# Patient Record
Sex: FEMALE | Employment: UNEMPLOYED | URBAN - METROPOLITAN AREA
[De-identification: names, ages, dates, MRNs, and addresses within clinical notes are randomized per-mention and may not be internally consistent; named-entity substitution may affect disease eponyms.]

---

## 2021-01-28 ENCOUNTER — HOSPITAL ENCOUNTER (EMERGENCY)
Facility: CLINIC | Age: 56
Discharge: HOME | End: 2021-01-28
Attending: EMERGENCY MEDICINE | Admitting: EMERGENCY MEDICINE
Payer: COMMERCIAL

## 2021-01-28 DIAGNOSIS — N17.9 ACUTE KIDNEY INJURY (H): ICD-10-CM

## 2021-01-28 DIAGNOSIS — R19.7 DIARRHEA, UNSPECIFIED TYPE: ICD-10-CM

## 2021-01-28 LAB
ANION GAP SERPL CALCULATED.3IONS-SCNC: 7 MMOL/L (ref 3–14)
BUN SERPL-MCNC: 36 MG/DL (ref 7–30)
CALCIUM SERPL-MCNC: 8.8 MG/DL (ref 8.5–10.1)
CHLORIDE SERPL-SCNC: 102 MMOL/L (ref 94–109)
CO2 SERPL-SCNC: 25 MMOL/L (ref 20–32)
CREAT SERPL-MCNC: 1.7 MG/DL (ref 0.52–1.04)
GFR SERPL CREATININE-BSD FRML MDRD: 33 ML/MIN/{1.73_M2}
GLUCOSE SERPL-MCNC: 110 MG/DL (ref 70–99)
LACTATE BLD-SCNC: 1.2 MMOL/L (ref 0.7–2)
POTASSIUM SERPL-SCNC: 3.2 MMOL/L (ref 3.4–5.3)
SODIUM SERPL-SCNC: 134 MMOL/L (ref 133–144)

## 2021-01-28 PROCEDURE — 258N000003 HC RX IP 258 OP 636: Performed by: EMERGENCY MEDICINE

## 2021-01-28 PROCEDURE — 83605 ASSAY OF LACTIC ACID: CPT | Performed by: EMERGENCY MEDICINE

## 2021-01-28 PROCEDURE — 99283 EMERGENCY DEPT VISIT LOW MDM: CPT | Mod: 25

## 2021-01-28 PROCEDURE — 80048 BASIC METABOLIC PNL TOTAL CA: CPT | Performed by: EMERGENCY MEDICINE

## 2021-01-28 PROCEDURE — 96361 HYDRATE IV INFUSION ADD-ON: CPT

## 2021-01-28 PROCEDURE — 96360 HYDRATION IV INFUSION INIT: CPT

## 2021-01-28 RX ADMIN — SODIUM CHLORIDE 1000 ML: 9 INJECTION, SOLUTION INTRAVENOUS at 21:13

## 2021-01-28 ASSESSMENT — ENCOUNTER SYMPTOMS
CHILLS: 1
ABDOMINAL PAIN: 0
VOMITING: 0
DIARRHEA: 1
SHORTNESS OF BREATH: 0
MYALGIAS: 0
DYSURIA: 0
BLOOD IN STOOL: 0

## 2021-01-28 ASSESSMENT — MIFFLIN-ST. JEOR: SCORE: 1049.02

## 2021-01-29 VITALS
TEMPERATURE: 98.6 F | WEIGHT: 115 LBS | HEIGHT: 61 IN | BODY MASS INDEX: 21.71 KG/M2 | OXYGEN SATURATION: 95 % | DIASTOLIC BLOOD PRESSURE: 68 MMHG | HEART RATE: 93 BPM | RESPIRATION RATE: 18 BRPM | SYSTOLIC BLOOD PRESSURE: 99 MMHG

## 2021-01-29 NOTE — ED TRIAGE NOTES
Here for generalized weakness x1 week associated with chills and coughing. Went to urgent care today, had blood and UA done that shows Creat 1.9 and WBC 12.5, got one liter of NS, was advised to come to ED for further eval. ABCs intact.

## 2021-01-29 NOTE — ED PROVIDER NOTES
"  History   Chief Complaint:  Generalized Weakness       HPI   Hallie Vila is a 55 year old female who presents with diarrhea. The patient has felt unwell for the past week, starting with feeling warm, chills. She also has had diarrhea. She has not been on any antibiotics recently. She was seen at urgent care today, but had abnormal labs, findings below, and was sent here. She denies body aches, difficulty breathing, vomiting, blood in stool, abdominal pain, dysuria.    21 Urgent Care Laboratory Findings  CBC: WBC 12.5 (H), HGB 12.1,     CMP: Glucose 144 (H), Sodium: 133 (L), Potassium: 3.4 (L), Chloride: 95 (L), Anion gap: 17 (H), BUN: 38 (H), Creatinine: 1.90 (H), GFR: 29 (L), Albumin: 3.2 (L), o/w WNL   UA: Clear yellow urine, Specific Gravity: >1.030 (H), Protein: 100, Bilirubin: Moderate, blood: Moderate, Leukocyte esterase: Small, RBC: 8-10, WBC: 21-50, Bacteria: Moderate, Squamous epithelia: Moderate, Crystals: present, otherwise WNL     Review of Systems   Constitutional: Positive for chills.   Respiratory: Negative for shortness of breath.    Gastrointestinal: Positive for diarrhea. Negative for abdominal pain, blood in stool and vomiting.   Genitourinary: Negative for dysuria.   Musculoskeletal: Negative for myalgias.   All other systems reviewed and are negative.      Allergies:  Penicillins    Medications:  The patient does not take any daily medications    Past Medical History:    Osteoporosis     Past Surgical History:    Ligate fallopian tube    section    Family History:    Mother: Hypertension     Social History:  The patient presents alone. Tobacco use: negative. Alcohol use: negative.    Physical Exam     Patient Vitals for the past 24 hrs:   BP Temp Temp src Pulse Resp SpO2 Height Weight   21 106/61 98.5  F (36.9  C) Temporal 106 18 100 % 1.549 m (5' 1\") 52.2 kg (115 lb)       Physical Exam  Constitutional: Alert, attentive  HENT:    Nose: Nose normal. "    Mouth/Throat: Oropharynx is clear, mucous membranes are moist  Eyes:  EOM are normal.    CV:  Tachycardic, regular rhythm; no murmurs, rubs or gallups  Chest: Effort normal and breath sounds normal.   GI:   Epigastrium - no tenderness, no guarding   RUQ - no tenderness or Beyer's sign   RLQ - No tenderness, no guarding, no Rovsing's sign   Suprapubic area - no tenderness, no guarding    LLQ - no tenderness, no guarding   LUQ - no tenderness, no guarding   No distension. Normal bowel sounds  MSK: Normal range of motion.   Neurological: Alert, attentive  Skin: Skin is warm and dry.      Emergency Department Course     Laboratory:  BMP: Glucose 110 (H), Potassium: 3.2 (L), Urea nitrogen: 36 (H), Creatinine: 1.70 (H), GFR: 33 (L) o/w WNL      2125 Lactic acid whole blood: 1.2     Emergency Department Course:    Reviewed:  I reviewed nursing notes, vitals and past medical history    Assessments:  2059 I obtained history and examined the patient as noted above.       Interventions:  2113 0.9% Sodium Chloride BOLUS 1000 mLs IV         Disposition:  pending      Impression & Plan     Medical Decision Making:  This is a pleasant 55-year-old female who presents for evaluation of diarrhea and abnormal labs from urgent care.  Overall symptoms appear consistent with infectious diarrhea.  She has a benign abdominal exam.  Initial heart rate is elevated blood pressure slightly lower, but she is nontoxic appearing.  Urgent care labs show prerenal acute kidney injury.  We have advised admission based on the above and recheck labs after the 1 L given in urgent care, showing continued CHANCE.  Patient is adamant that she be discharged, she notes she has been reducing her p.o. intake prior to evaluation to reduce diarrhea episodes, and is confident she can rehydrate at home.  Her additional liter is not completed here.  We will plan for my partner to recheck her to ensure improving vital signs and to revisit the admission conversation  if not.      Covid-19  Hallie Vila was evaluated during a global COVID-19 pandemic, which necessitated consideration that the patient might be at risk for infection with the SARS-CoV-2 virus that causes COVID-19.   Applicable protocols for evaluation were followed during the patient's care.       Diagnosis:    ICD-10-CM    1. Acute kidney injury (H)  N17.9 Basic metabolic panel (BMP)     Lactic acid whole blood   2. Diarrhea, unspecified type  R19.7        Scribe Disclosure:  I, Ngozi Jimenez, am serving as a scribe at 8:52 PM on 1/28/2021 to document services personally performed by Natanael Guzman MD based on my observations and the provider's statements to me.        Natanael Guzman MD  01/28/21 [id number]

## 2021-02-01 ENCOUNTER — HOSPITAL ENCOUNTER (EMERGENCY)
Facility: CLINIC | Age: 56
Discharge: HOME | End: 2021-02-01
Attending: EMERGENCY MEDICINE | Admitting: EMERGENCY MEDICINE
Payer: COMMERCIAL

## 2021-02-01 VITALS
RESPIRATION RATE: 18 BRPM | HEART RATE: 81 BPM | TEMPERATURE: 97.4 F | SYSTOLIC BLOOD PRESSURE: 106 MMHG | OXYGEN SATURATION: 93 % | DIASTOLIC BLOOD PRESSURE: 64 MMHG

## 2021-02-01 DIAGNOSIS — E87.6 HYPOKALEMIA: ICD-10-CM

## 2021-02-01 DIAGNOSIS — R19.7 DIARRHEA, UNSPECIFIED TYPE: ICD-10-CM

## 2021-02-01 LAB
ALBUMIN UR-MCNC: NEGATIVE MG/DL
ANION GAP SERPL CALCULATED.3IONS-SCNC: 7 MMOL/L (ref 3–14)
APPEARANCE UR: CLEAR
BACTERIA #/AREA URNS HPF: ABNORMAL /HPF
BASOPHILS # BLD AUTO: 0 10E9/L (ref 0–0.2)
BASOPHILS NFR BLD AUTO: 0.2 %
BILIRUB UR QL STRIP: NEGATIVE
BUN SERPL-MCNC: 9 MG/DL (ref 7–30)
CALCIUM SERPL-MCNC: 8.8 MG/DL (ref 8.5–10.1)
CHLORIDE SERPL-SCNC: 101 MMOL/L (ref 94–109)
CO2 SERPL-SCNC: 30 MMOL/L (ref 20–32)
COLOR UR AUTO: ABNORMAL
CREAT SERPL-MCNC: 0.82 MG/DL (ref 0.52–1.04)
DIFFERENTIAL METHOD BLD: ABNORMAL
EOSINOPHIL # BLD AUTO: 0 10E9/L (ref 0–0.7)
EOSINOPHIL NFR BLD AUTO: 0.7 %
ERYTHROCYTE [DISTWIDTH] IN BLOOD BY AUTOMATED COUNT: 12.9 % (ref 10–15)
GFR SERPL CREATININE-BSD FRML MDRD: 80 ML/MIN/{1.73_M2}
GLUCOSE SERPL-MCNC: 104 MG/DL (ref 70–99)
GLUCOSE UR STRIP-MCNC: NEGATIVE MG/DL
HCT VFR BLD AUTO: 32.9 % (ref 35–47)
HGB BLD-MCNC: 10.3 G/DL (ref 11.7–15.7)
HGB UR QL STRIP: ABNORMAL
IMM GRANULOCYTES # BLD: 0 10E9/L (ref 0–0.4)
IMM GRANULOCYTES NFR BLD: 0.2 %
KETONES UR STRIP-MCNC: NEGATIVE MG/DL
LEUKOCYTE ESTERASE UR QL STRIP: ABNORMAL
LYMPHOCYTES # BLD AUTO: 0.8 10E9/L (ref 0.8–5.3)
LYMPHOCYTES NFR BLD AUTO: 17.4 %
MAGNESIUM SERPL-MCNC: 2.4 MG/DL (ref 1.6–2.3)
MCH RBC QN AUTO: 29.2 PG (ref 26.5–33)
MCHC RBC AUTO-ENTMCNC: 31.3 G/DL (ref 31.5–36.5)
MCV RBC AUTO: 93 FL (ref 78–100)
MONOCYTES # BLD AUTO: 0.4 10E9/L (ref 0–1.3)
MONOCYTES NFR BLD AUTO: 9.5 %
MUCOUS THREADS #/AREA URNS LPF: PRESENT /LPF
NEUTROPHILS # BLD AUTO: 3.3 10E9/L (ref 1.6–8.3)
NEUTROPHILS NFR BLD AUTO: 72 %
NITRATE UR QL: NEGATIVE
NRBC # BLD AUTO: 0 10*3/UL
NRBC BLD AUTO-RTO: 0 /100
PH UR STRIP: 6 PH (ref 5–7)
PLATELET # BLD AUTO: 313 10E9/L (ref 150–450)
POTASSIUM SERPL-SCNC: 3 MMOL/L (ref 3.4–5.3)
RBC # BLD AUTO: 3.53 10E12/L (ref 3.8–5.2)
RBC #/AREA URNS AUTO: 1 /HPF (ref 0–2)
SODIUM SERPL-SCNC: 138 MMOL/L (ref 133–144)
SOURCE: ABNORMAL
SP GR UR STRIP: 1 (ref 1–1.03)
UROBILINOGEN UR STRIP-MCNC: NORMAL MG/DL (ref 0–2)
WBC # BLD AUTO: 4.5 10E9/L (ref 4–11)
WBC #/AREA URNS AUTO: 1 /HPF (ref 0–5)

## 2021-02-01 PROCEDURE — 99284 EMERGENCY DEPT VISIT MOD MDM: CPT | Mod: 25

## 2021-02-01 PROCEDURE — 81001 URINALYSIS AUTO W/SCOPE: CPT | Performed by: EMERGENCY MEDICINE

## 2021-02-01 PROCEDURE — 80048 BASIC METABOLIC PNL TOTAL CA: CPT | Performed by: EMERGENCY MEDICINE

## 2021-02-01 PROCEDURE — 93005 ELECTROCARDIOGRAM TRACING: CPT

## 2021-02-01 PROCEDURE — 258N000003 HC RX IP 258 OP 636: Performed by: EMERGENCY MEDICINE

## 2021-02-01 PROCEDURE — 250N000011 HC RX IP 250 OP 636: Performed by: EMERGENCY MEDICINE

## 2021-02-01 PROCEDURE — 250N000013 HC RX MED GY IP 250 OP 250 PS 637: Performed by: EMERGENCY MEDICINE

## 2021-02-01 PROCEDURE — 83735 ASSAY OF MAGNESIUM: CPT | Performed by: EMERGENCY MEDICINE

## 2021-02-01 PROCEDURE — 96365 THER/PROPH/DIAG IV INF INIT: CPT

## 2021-02-01 PROCEDURE — 85025 COMPLETE CBC W/AUTO DIFF WBC: CPT | Performed by: EMERGENCY MEDICINE

## 2021-02-01 PROCEDURE — 96361 HYDRATE IV INFUSION ADD-ON: CPT

## 2021-02-01 RX ORDER — MAGNESIUM SULFATE HEPTAHYDRATE 40 MG/ML
2 INJECTION, SOLUTION INTRAVENOUS ONCE
Status: COMPLETED | OUTPATIENT
Start: 2021-02-01 | End: 2021-02-01

## 2021-02-01 RX ORDER — LOPERAMIDE HYDROCHLORIDE 2 MG/1
2 TABLET ORAL 4 TIMES DAILY PRN
Qty: 20 TABLET | Refills: 0 | Status: SHIPPED | OUTPATIENT
Start: 2021-02-01

## 2021-02-01 RX ORDER — SODIUM CHLORIDE 9 MG/ML
1000 INJECTION, SOLUTION INTRAVENOUS CONTINUOUS
Status: DISCONTINUED | OUTPATIENT
Start: 2021-02-01 | End: 2021-02-02 | Stop reason: HOSPADM

## 2021-02-01 RX ORDER — POTASSIUM CHLORIDE 1500 MG/1
20 TABLET, EXTENDED RELEASE ORAL 2 TIMES DAILY
Qty: 14 TABLET | Refills: 0 | Status: SHIPPED | OUTPATIENT
Start: 2021-02-01 | End: 2021-02-08

## 2021-02-01 RX ORDER — POTASSIUM CHLORIDE 1.5 G/1.58G
40 POWDER, FOR SOLUTION ORAL ONCE
Status: COMPLETED | OUTPATIENT
Start: 2021-02-01 | End: 2021-02-01

## 2021-02-01 RX ADMIN — POTASSIUM CHLORIDE 40 MEQ: 1.5 POWDER, FOR SOLUTION ORAL at 22:26

## 2021-02-01 RX ADMIN — SODIUM CHLORIDE 1000 ML: 9 INJECTION, SOLUTION INTRAVENOUS at 21:46

## 2021-02-01 RX ADMIN — SODIUM CHLORIDE 1000 ML: 9 INJECTION, SOLUTION INTRAVENOUS at 22:26

## 2021-02-01 RX ADMIN — MAGNESIUM SULFATE HEPTAHYDRATE 2 G: 2 INJECTION, SOLUTION INTRAVENOUS at 22:03

## 2021-02-01 ASSESSMENT — ENCOUNTER SYMPTOMS
FREQUENCY: 0
COUGH: 0
VOMITING: 0
NAUSEA: 0
DIARRHEA: 1
DYSURIA: 0
BLOOD IN STOOL: 0
FEVER: 0
SHORTNESS OF BREATH: 0
ABDOMINAL PAIN: 0

## 2021-02-02 LAB — INTERPRETATION ECG - MUSE: NORMAL

## 2021-02-02 NOTE — ED TRIAGE NOTES
Here for generalized weakness and feeling anxious. Stated that she was seen here on 1/28 for chills and diarrhea, and was discharge, but has not felt better. ABCs intact.

## 2021-02-02 NOTE — ED PROVIDER NOTES
History   Chief Complaint:  Diarrhea, dehydration     HPI   Hallie Vila is a 55 year old female who was seen at the ER on  for a diarrhea and acute kidney injury and declined admission recommended by the MD who presents with persistent diarrhea and feeling dehydrated.  Patient states she has been drinking 8 glasses of water a day but still feels dehydrated.  She continues to have diarrhea daily but denies any fever, bloody or mucous stools, nausea/vomiting, abdominal pain.  She denies any recent antibiotic use.  She denies any coughing, chest pain, shortness of breath.    Review of Systems   Constitutional: Negative for fever.   Respiratory: Negative for cough and shortness of breath.    Cardiovascular: Negative for chest pain.   Gastrointestinal: Positive for diarrhea. Negative for abdominal pain, blood in stool, nausea and vomiting.   Genitourinary: Negative.  Negative for dysuria, frequency and urgency.   All other systems reviewed and are negative.      Allergies:  Penicillins    Medications:  The patient is not currently taking any prescribed medications.    Past Medical History:    Osteoporosis   CHANCE    Past Surgical History:    Ligate fallopian tube      Family History:    Mother: Hypertension    Social History:  The patient was unaccompanied to the ER.  Tobacco use: negative. Alcohol use: negative.    Physical Exam     Patient Vitals for the past 24 hrs:   BP Temp Temp src Pulse Resp SpO2   21 2330 106/64 -- -- 81 -- --   21 2315 104/65 -- -- -- -- --   21 2300 98/61 -- -- 82 -- 93 %   21 2245 100/45 -- -- 85 -- 100 %   21 2230 99/58 -- -- 84 -- --   21 2215 105/50 -- -- 83 -- 100 %   21 2145 105/59 -- -- 84 -- 99 %   21 2110 125/66 97.4  F (36.3  C) Oral 93 18 100 %       Physical Exam  General: Alert, no acute distress; well appearing  HEENT:  Moist mucous membranes. Conjunctiva normal.  CV:  RRR, no m/r/g, skin warm and well  perfused  Pulm:  CTAB, no wheezes/ronchi/rales.  No acute distress, breathing comfortably  GI:  Soft, nontender, nondistended.  No rebound or guarding.  Normal bowel sounds  MSK:  Moving all extremities.  No focal areas of edema, erythema, or tenderness  Skin:  WWP, no rashes, no lower extremity edema, skin color normal, no diaphoresis  Psych:  Well-appearing, normal affect, regular speech    Emergency Department Course     ECG:  ECG taken at 2134, ECG read at 2139  Normal sinus rhythm  Prolonged QT  Abnormal ECG  Rate 86 bpm. MI interval 136. QRS duration 80. QT/QTc 420/502. P-R-T axes 78 68 63.    Imaging:    No orders to display     The above imaging workup was performed.     Laboratory:    CBC: WBC 4.5, HGB 10.3(L),   BMP: Potassium 3.0(L), Glucose 104(H) o/w WNL (Creatinine 0.82)  Magnesium: 2.4(H)    UA with micro: Specific Gravity 1.002(L), Blood Trace(A), Leukocyte Esterase Small(A), Bacteria Few(A), Mucous Present(A) o/w WNL        Emergency Department Course:    Reviewed:  I reviewed nursing notes, vitals, past medical history, and care everywhere    Assessments:  2122 I obtained history and examined the patient as noted above.    I rechecked the patient and explained findings.  I also spoke with patient's daughter over the phone regarding patient's presentation, workup, and plan of care.      Interventions:  2146: Normal Saline, 1 liter, IV bolus   2203: Magnesium Sulfate, 2 g, IV  2226: Klor-Con, 40 mEq, Oral  2226: Normal Saline, 1 liter, IV bolus       Disposition:  The patient was discharged to home.       Impression & Plan     Medical Decision Making:  Hallie Vila is a 55 year old female with no significant PMH who presents to the ER for evaluation of ongoing diarrhea and concern for dehydration.  Please see above for details of HPI and exam.  Patient was seen 1/28 after 1 week of diarrhea and sent to the ER with concern for CHANCE and UTI.  She was found to have CHANCE and recommended  at the time for admission but patient wanted to be discharged home with oral hydration.  She presents for persistent diarrhea.  No fevers or bloody stools.  She has no abdominal pain and she has no abdominal tenderness on exam to suggest intraabdominal catastrophe or surgical abdomen.  I do not feel she requires any imaging.  Lab studies repeated here show improvement of her kidney function as well as mild hypokalemia.  UA is normal.  Screening EKG obtained from triage shows NSR without any acute ischemic changes.  Patient denies any chest pain.  She does have slight prolongation of QT which I suspect is from her mild hypokalemia and fluid losses which was replaced orally.  She was also given IV magnesium and fluids.  Doubt bacterial etiology including C diff for her diarrhea given lack of risk factors, symptoms, and exam.  I would not start antibiotics or send for stool studies at this time.  Given her overall well appearance, exam, and workup today, with reasonable clinical certainty I feel she is safe to discharge home with symptomatic care and continued increased fluid intake.  Discussed this with the patient and daughter and both were agreeable with this plan. We will start imodium prn diarrhea as well as oral potassium supplementation.  She does not have a primary care doctor - therefore she was given information for [facility] clinic to establish care with a PCP.  I also discussed the signs and symptoms that should prompt her urgent ER return.  All questions were answered prior to discharge.      Diagnosis:    ICD-10-CM    1. Diarrhea, unspecified type  R19.7    2. Hypokalemia  E87.6        Discharge Medications:  Discharge Medication List as of 2/1/2021 11:35 PM      START taking these medications    Details   loperamide (IMODIUM A-D) 2 MG tablet Take 1 tablet (2 mg) by mouth 4 times daily as needed for diarrhea, Disp-20 tablet, R-0, Local Print      potassium chloride ER (KLOR-CON M) 20 MEQ CR tablet  Take 1 tablet (20 mEq) by mouth 2 times daily for 7 days, Disp-14 tablet, R-0, Local Print             Scribe Disclosure:  I, Gokul Vance, am serving as a scribe at 9:17 PM on 2/1/2021 to document services personally performed by Dino Goncalves MD based on my observations and the provider's statements to me.          Dino Goncalves MD  02/01/21 [id number]

## 2021-02-21 ENCOUNTER — NURSE TRIAGE (OUTPATIENT)
Dept: NURSING | Facility: CLINIC | Age: 56
End: 2021-02-21

## 2021-02-21 NOTE — TELEPHONE ENCOUNTER
Pt calling to discuss ED labs from 02/21.      Information provided, advised scheduling an appt with a provider to establish care and for ED f/u.  She verbalized understanding and had no further questions.  Call transferred to [facility] as requested.     Ashwini Sanchez RN/FNA

## 2023-10-30 NOTE — DISCHARGE INSTRUCTIONS
Discharge Instructions  Adult Diarrhea    You have been seen today for diarrhea (loose stools). This is usually caused by a virus, but some bacteria, parasites, medicines, or other medical conditions can cause similar symptoms. At this time your provider does not find that your diarrhea is a sign of anything dangerous or life-threatening. However, sometimes the signs of serious illness do not show up right away. If you have new or worse symptoms, you may need to be seen again in the Emergency Department or by your primary provider.     Generally, every Emergency Department visit should have a follow-up clinic visit with either a primary or a specialty clinic/provider. Please follow-up as instructed by your emergency provider today.    Return to the Emergency Department if:  You feel you are getting dehydrated, such as being very thirsty, not urinating (peeing) like usual, or feeling faint or lightheaded.   You develop a new fever.  You have abdominal (belly) pain that seems worse than cramps, is in one spot, or is getting worse over time.   You have blood in your stool or your stool becomes black.  (Remember that if you take Pepto-Bismol , this will turn your stool black).   You feel very weak.    What can I do to help myself?  The most important thing to do is to drink clear liquids.   It is best to have only small, frequent sips of liquids. Drinking too much at once may cause more diarrhea. You should also replace minerals, sodium and potassium lost with diarrhea. Pedialyte  and sports drinks can help you replace these minerals. You can also drink clear liquids such as water, weak tea, apple juice, and 7-Up . Avoid acidic liquids (orange juice), caffeine (coffee) or alcohol. Milk products will make the diarrhea worse.  Eat bland (plain) foods. Soda crackers, toast, plain noodles, gelatin, applesauce and bananas are good first choices. Avoid foods that have acid, are spicy, fatty or fibrous (such as meats, coarse  "grains, vegetables). You may start eating these foods again in about 3 days when you are better.   Sometimes treatment includes prescription medicine to prevent diarrhea. If your provider prescribes these for you, take them as directed.   Nonprescription medicine is available for the treatment of diarrhea and can be very effective. If you use it, make sure you use the dose recommended on the package. Check with your healthcare provider before you use any medicine for diarrhea.   Do not take ibuprofen, or other nonsteroidal anti-inflammatory medicines, without checking with your healthcare provider.   Probiotics: If you have been given an antibiotic, you may want to also take a probiotic pill or eat yogurt with live cultures. Probiotics have \"good bacteria\" to help your intestines stay healthy. Studies have shown that probiotics help prevent diarrhea and other intestine problems (including C. diff infection) when you take antibiotics. You can buy these without a prescription in the pharmacy section of the store.   If you were given a prescription for medicine here today, be sure to read all of the information (including the package insert) that comes with your prescription.  This will include important information about the medicine, its side effects, and any warnings that you need to know about.  The pharmacist who fills the prescription can provide more information and answer questions you may have about the medicine.  If you have questions or concerns that the pharmacist cannot address, please call or return to the Emergency Department.  Remember that you can always come back to the Emergency Department if you are not able to see your regular provider in the amount of time listed above, if you get any new symptoms, or if there is anything that worries you.  " [place]azorac Pregnancy And Lactation Text: This medication is not safe during pregnancy. It is unknown if this medication is excreted in breast milk.